# Patient Record
Sex: MALE | Race: WHITE | Employment: FULL TIME | URBAN - NONMETROPOLITAN AREA
[De-identification: names, ages, dates, MRNs, and addresses within clinical notes are randomized per-mention and may not be internally consistent; named-entity substitution may affect disease eponyms.]

---
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
## 2021-08-09 ENCOUNTER — HOSPITAL ENCOUNTER (EMERGENCY)
Age: 21
Discharge: HOME | End: 2021-08-09
Payer: COMMERCIAL

## 2021-08-09 VITALS
RESPIRATION RATE: 15 BRPM | HEART RATE: 85 BPM | OXYGEN SATURATION: 99 % | TEMPERATURE: 98.3 F | SYSTOLIC BLOOD PRESSURE: 143 MMHG | DIASTOLIC BLOOD PRESSURE: 76 MMHG

## 2021-08-09 DIAGNOSIS — Z20.822 PERSON UNDER INVESTIGATION FOR COVID-19: Primary | ICD-10-CM

## 2021-08-09 LAB — SARS-COV-2, NAAT: NOT DETECTED

## 2021-08-09 PROCEDURE — 99282 EMERGENCY DEPT VISIT SF MDM: CPT

## 2021-08-09 PROCEDURE — 87635 SARS-COV-2 COVID-19 AMP PRB: CPT

## 2021-08-09 ASSESSMENT — ENCOUNTER SYMPTOMS
WHEEZING: 0
COUGH: 0
DIARRHEA: 0
CHEST TIGHTNESS: 0
RHINORRHEA: 0
NAUSEA: 0
BACK PAIN: 0
EYE DISCHARGE: 0
VOMITING: 0
EYE PAIN: 0

## 2021-08-09 NOTE — ED NOTES
Presents to ER with complaints of needing a COVID test. Pt states his boyfriend tested positive and he has been with him the last few days. Pt denies any symptoms.      Lorin Beth Person) ISMA Duncan RN  08/09/21 3936

## 2021-08-09 NOTE — ED PROVIDER NOTES
[facility] 65 22 COMPLAINT       Chief Complaint   Patient presents with    Covid Testing       Nurses Notes reviewed and I agree except as notedin the HPI. HISTORY OF PRESENT ILLNESS    Calvin Su is a 24 y.o. male who presents requesting Covid testing. The patient is currently not having symptoms. His boyfriend tested positive for COVID-19 and he is unvaccinated. He had a high risk exposure. Akhil Collins REVIEW OF SYSTEMS     Review of Systems   Constitutional: Negative for chills, fatigue and fever. HENT: Negative for congestion, ear pain and rhinorrhea. Eyes: Negative for pain and discharge. Respiratory: Negative for cough, chest tightness and wheezing. Cardiovascular: Negative for chest pain, palpitations and leg swelling. Gastrointestinal: Negative for diarrhea, nausea and vomiting. Genitourinary: Negative for dysuria and frequency. Musculoskeletal: Negative for arthralgias, back pain, myalgias and neck pain. Skin: Negative for rash. Neurological: Negative for dizziness, weakness and headaches. All other systems reviewed and are negative. PAST MEDICAL HISTORY    has a past medical history of Eczema. SURGICAL HISTORY      has no past surgical history on file.     CURRENT MEDICATIONS       Discharge Medication List as of 8/9/2021  2:09 PM      CONTINUE these medications which have NOT CHANGED    Details   ondansetron (ZOFRAN ODT) 4 MG disintegrating tablet Take 1 tablet by mouth every 8 hours as needed for Nausea or Vomiting, Disp-12 tablet, R-0Print      naproxen (NAPROSYN) 500 MG tablet Take 1 tablet by mouth 2 times daily (with meals) for 20 doses, Disp-20 tablet, R-0Print      tiZANidine (ZANAFLEX) 4 MG tablet Take 1 tablet by mouth every 6 hours as needed (Pain and spasm) Caution not at work will cause drowsiness, Disp-15 tablet, R-0Print      triamcinolone (KENALOG) 0.1 % cream Apply topically 2 times daily Apply topically 2 times daily. , Topical, 2 TIMES DAILY, Until Discontinued, Historical Med             ALLERGIES     has No Known Allergies. HISTORY     He indicated that his mother is alive. He indicated that his father is alive. He indicated that the status of his maternal grandmother is unknown. He indicated that the status of his paternal grandmother is unknown. He indicated that the status of his neg hx is unknown.   family history includes Cancer in his maternal grandmother and paternal grandmother; Diabetes in his maternal grandmother. SOCIALHISTORY      reports that he has been smoking. He has a 0.50 pack-year smoking history. He has never used smokeless tobacco. He reports current drug use. Drug: Marijuana. He reports that he does not drink alcohol. PHYSICAL EXAM     INITIAL VITALS:  oral temperature is 98.3 °F (36.8 °C). His blood pressure is 143/76 (abnormal) and his pulse is 85. His respiration is 15 and oxygen saturation is 99%. Physical Exam  Vitals and nursing note reviewed. Constitutional:       Comments: Well Developed Well Nourished Appearing     HENT:      Head: Normocephalic and atraumatic. Right Ear: Tympanic membrane normal.      Left Ear: Tympanic membrane normal.   Eyes:      Pupils: Pupils are equal, round, and reactive to light. Cardiovascular:      Rate and Rhythm: Normal rate and regular rhythm. Heart sounds: Normal heart sounds. Pulmonary:      Effort: Pulmonary effort is normal. No respiratory distress. Breath sounds: Normal breath sounds. No wheezing. Abdominal:      General: Bowel sounds are normal. There is no distension. Palpations: Abdomen is soft. Musculoskeletal:      Cervical back: Normal range of motion and neck supple.          DIFFERENTIAL DIAGNOSIS:   Covid exposure    DIAGNOSTIC RESULTS     EKG: All EKG's are interpreted by the Emergency Department Physician who either signs or Co-signs this chart in the absence of a cardiologist.      RADIOLOGY: non-plain film images(s) such as CT, Ultrasound and MRI are read by the radiologist.  None      LABS:   Labs Reviewed   COVID-19, RAPID       EMERGENCY DEPARTMENT COURSE:   :    Vitals:    08/09/21 1318   BP: (!) 143/76   Pulse: 85   Resp: 15   Temp: 98.3 °F (36.8 °C)   TempSrc: Oral   SpO2: 99%     Patient was seen history physical exam was performed. See disposition below    CRITICAL CARE:  None    CONSULTS:  None    PROCEDURES:  None    FINAL IMPRESSION      1. Person under investigation for COVID-19          DISPOSITION/PLAN   Discharge    PATIENT REFERRED TO:  Evi Montoya, APRN - CNP  [address] Jessica Horowitz  [phone]    In 1 week  You had a first degree exposure (high risk) exposure to COVID 19. You need to self isolate for 10 days. Call [phone] for vaccine information.       DISCHARGE MEDICATIONS:  Discharge Medication List as of 8/9/2021  2:09 PM          (Please note that portions of this note were completed with a voice recognitionprogram.  Efforts were made to edit the dictations but occasionally words are mis-transcribed.)    VIRGIL Genao PA  08/09/21 2300 [facility], [address] Kate Bautista  08/09/21 9700

## 2022-02-18 ENCOUNTER — OFFICE VISIT (OUTPATIENT)
Dept: FAMILY MEDICINE CLINIC | Age: 22
End: 2022-02-18
Payer: COMMERCIAL

## 2022-02-18 VITALS
HEART RATE: 83 BPM | RESPIRATION RATE: 12 BRPM | SYSTOLIC BLOOD PRESSURE: 126 MMHG | BODY MASS INDEX: 19.67 KG/M2 | HEIGHT: 73 IN | TEMPERATURE: 98.2 F | WEIGHT: 148.4 LBS | OXYGEN SATURATION: 98 % | DIASTOLIC BLOOD PRESSURE: 78 MMHG

## 2022-02-18 DIAGNOSIS — H61.23 BILATERAL IMPACTED CERUMEN: ICD-10-CM

## 2022-02-18 DIAGNOSIS — Z76.89 ENCOUNTER TO ESTABLISH CARE: Primary | ICD-10-CM

## 2022-02-18 PROCEDURE — 99203 OFFICE O/P NEW LOW 30 MIN: CPT | Performed by: NURSE PRACTITIONER

## 2022-02-18 ASSESSMENT — PATIENT HEALTH QUESTIONNAIRE - PHQ9
2. FEELING DOWN, DEPRESSED OR HOPELESS: 0
SUM OF ALL RESPONSES TO PHQ QUESTIONS 1-9: 0
SUM OF ALL RESPONSES TO PHQ9 QUESTIONS 1 & 2: 0
1. LITTLE INTEREST OR PLEASURE IN DOING THINGS: 0
SUM OF ALL RESPONSES TO PHQ QUESTIONS 1-9: 0

## 2022-02-18 NOTE — PATIENT INSTRUCTIONS
Patient Education        Earwax Blockage: Care Instructions  Your Care Instructions     Earwax is a natural substance that protects the ear canal. Normally, earwax drains from the ears and does not cause problems. Sometimes earwax builds up and hardens. Earwax blockage (also called cerumen impaction) can cause some loss of hearing and pain. When wax is tightly packed, you will need to have your doctor remove it. Follow-up care is a key part of your treatment and safety. Be sure to make and go to all appointments, and call your doctor if you are having problems. It's also a good idea to know your test results and keep a list of the medicines you take. How can you care for yourself at home? · Do not try to remove earwax with cotton swabs, fingers, or other objects. This can make the blockage worse and damage the eardrum. · If your doctor recommends that you try to remove earwax at home:  ? Soften and loosen the earwax with warm mineral oil. You also can try hydrogen peroxide mixed with an equal amount of room temperature water. Place 2 drops of the fluid, warmed to body temperature, in the ear two times a day for up to 5 days. ? Once the wax is loose and soft, all that is usually needed to remove it from the ear canal is a gentle, warm shower. Direct the water into the ear, then tip your head to let the earwax drain out. Dry your ear thoroughly with a hair dryer set on low. Hold the dryer several inches from your ear. ? If the warm mineral oil and shower do not work, use an over-the-counter wax softener. Read and follow all instructions on the label. After using the wax softener, use an ear syringe to gently flush the ear. Make sure the flushing solution is body temperature. Cool or hot fluids in the ear can cause dizziness. When should you call for help?    Call your doctor now or seek immediate medical care if:    · Pus or blood drains from your ear.     · Your ears are ringing or feel full.     · You have a loss of hearing. Watch closely for changes in your health, and be sure to contact your doctor if:    · You have pain or reduced hearing after 1 week of home treatment.     · You have any new symptoms, such as nausea or balance problems. Where can you learn more? Go to https://[email or web address].[email or web address] org and sign in to your [employer] account. Enter H510 in the [employer] Love box to learn more about \"Earwax Blockage: Care Instructions. \"     If you do not have an account, please click on the \"Sign Up Now\" link. Current as of: July 1, 2021               Content Version: 13.1  © 7139-0215 [employer]wise, [employer]. Care instructions adapted under license by [facility] ([facility]). If you have questions about a medical condition or this instruction, always ask your healthcare professional. [address] any warranty or liability for your use of this information.

## 2022-02-18 NOTE — PROGRESS NOTES
[facility] Medicine at / [address] [facility]  [address] [facility]  Chief Complaint   Patient presents with    Ear Fullness     Left ear started hurting yesterday        History obtained from chart review and the patient. SUBJECTIVE:  Stevenson Reed is a 25 y.o. male that presents today for establishing care with new physician, etc. New patient, [facility]. Ear Complaint    HPI:  Symptoms have been present for 1-2 day(s). Can't hear out of left ear, feels full and plugged. Inciting incident or history of trauma? no  Decreased hearing? Yes  Ear tenderness? No  Ear drainage? No  Feeling of fullness? Yes  Radiation of the pain? No  Dizziness or pre-syncope? No  Affected by position or head movment? No  Sore throat, rhinorrhea or sinus congestion? No  Hx of Bruxism? No  Treatment tried and response - nothing      Age/Gender Health Maintenance    Lipid - 40  DM Screen - 40  Colon Cancer Screening - 39  Lung Cancer Screening (Age 54 to [de-identified] with 30 pack year hx, current smoker or quit within past 15 years) - 48    Tetanus - needs  Influenza Vaccine - yearly  Pneumonia Vaccine - 65  Zostavax - 50   HPV Vaccine - needs    PSA testing discussion - 55  AAA Screening - n/a    Falls screening - n/a    Current Outpatient Medications   Medication Sig Dispense Refill    carbamide peroxide (DEBROX) 6.5 % otic solution Place 5 drops into both ears 2 times daily 15 mL 2     No current facility-administered medications for this visit. Orders Placed This Encounter   Medications    carbamide peroxide (DEBROX) 6.5 % otic solution     Sig: Place 5 drops into both ears 2 times daily     Dispense:  15 mL     Refill:  2         All medications reviewed and reconciled, including OTC and herbal medications. Updated list given to patient. There is no problem list on file for this patient. Past Medical History:   Diagnosis Date    Eczema        History reviewed.  No pertinent surgical  Unstable Housing in the Last Year: Not on file       Family History   Problem Relation Age of Onset    Cancer Maternal Grandmother         colon    Diabetes Maternal Grandmother     Cancer Paternal Grandmother         colon    Asthma Neg Hx     Stroke Neg Hx          I have reviewed the patient's past medical history, past surgical history, allergies, medications, social and family history and I have made updates where appropriate. Review of Systems  Positive responses are highlighted in bold    Constitutional:  Fever, Chills, Night Sweats, Fatigue, Unexpected changes in weight  Eyes:  Eye discharge, Eye pain, Eye redness, Visual disturbances   HENT:  Ear pain, Tinnitus, Nosebleeds, Trouble swallowing, Hearing loss, Sore throat  Cardiovascular:  Chest Pain, Palpitations, Orthopnea, Paroxysmal Nocturnal Dyspnea  Respiratory:  Cough, Wheezing, Shortness of breath, Chest tightness, Apnea  Gastrointestinal:  Nausea, Vomiting, Diarrhea, Constipation, Heartburn, Blood in stool  Genitourinary:  Difficulty or painful urination, Flank pain, Change in frequency, Urgency  Skin:  Color change, Rash, Itching, Wound  Psychiatric:  Hallucinations, Anxiety, Depression, Suicidal ideation  Hematological:  Enlarged glands, Easy bleeding, Easily bruising  Musculoskeletal:  Joint pain, Back pain, Gait problems, Joint swelling, Myalgias  Neurological:  Dizziness, Headaches, Presyncope, Numbness, Seizures, Tremors  Allergy:  Environmental allergies, Food allergies  Endocrine:  Heat Intolerance, Cold Intolerance, Polydipsia, Polyphagia, Polyuria      PHYSICAL EXAM:  Vitals:    02/18/22 1000   BP: 126/78   Pulse: 83   Resp: 12   Temp: 98.2 °F (36.8 °C)   TempSrc: Oral   SpO2: 98%   Weight: 148 lb 6.4 oz (67.3 kg)   Height: 6' 1\" (1.854 m)     Body mass index is 19.58 kg/m².          VS Reviewed  General Appearance: A&O x 3, No acute distress,well developed and well- nourished  Head: normocephalic and atraumatic  Eyes: pupils equal, round, and reactive to light, extraocular eye movements intact, conjunctivae and eye lids without erythema  ENT: external ear and ear canals completely impacted with cerumen plug  Neck: supple and non-tender without mass, no thyromegaly or thyroid nodules, no cervical lymphadenopathy  Pulmonary/Chest: clear to auscultation bilaterally- no wheezes, rales or rhonchi, normal air movement, no respiratory distress or retractions  Cardiovascular: S1 and S2 auscultated w/ RRR. No murmurs, rubs, clicks, or gallops, distal pulses intact. Abdomen: soft, non-tender, non-distended, bowl sounds physiologic,  no rebound or guarding, no masses or hernias noted. Liver and spleen without enlargement. Extremities: no cyanosis, clubbing or edema of the lower extremities  Musculoskeletal: No joint swelling or gross deformity   Neuro:  Alert, 5/5 strength globally and symmetrically  Psych: Affect appropriate. Mood normal. Thought process is normal without evidence of depression or psychosis. Good insight and appropriate interaction. Cognition and memory appear to be intact. Skin: warm and dry, no rash or erythema  Lymph:  No cervical, auricular or supraclavicular lymph nodes palpated    ASSESSMENT & PLAN  Jamia Pham was seen today for ear fullness. Diagnoses and all orders for this visit:    Encounter to establish care    Bilateral impacted cerumen  -     carbamide peroxide (DEBROX) 6.5 % otic solution; Place 5 drops into both ears 2 times daily      - ear lavage completed with good results  - start Debrox    DISPOSITION    Return if symptoms worsen or fail to improve. Jamia Pham released without restrictions. PATIENT COUNSELING    Counseling was provided today regarding the following topics: Healthy eating habits, Regular exercise, substance abuse and healthy sleep habits.     Jamia Pham received counseling on the following healthy behaviors: none    Patient given educational materials on: See Attached    I have instructed Jamia Pham to complete a self tracking handout on none and instructed them to bring it with them to his next appointment. Barriers to learning and self management: none    Discussed use, benefit, and side effects of prescribed medications. Barriers to medication compliance addressed. All patient questions answered. Pt voiced understanding.        Electronically signed by CARLOS ALBERTO Curran CNP on 2/18/2022 at 10:21 AM

## 2024-11-06 ENCOUNTER — HOSPITAL ENCOUNTER (EMERGENCY)
Age: 24
Discharge: HOME | End: 2024-11-06
Payer: COMMERCIAL

## 2024-11-06 VITALS
OXYGEN SATURATION: 100 % | RESPIRATION RATE: 20 BRPM | SYSTOLIC BLOOD PRESSURE: 120 MMHG | DIASTOLIC BLOOD PRESSURE: 90 MMHG | TEMPERATURE: 98.1 F | HEART RATE: 117 BPM
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
## 2024-11-06 DIAGNOSIS — I95.9 HYPOTENSION, UNSPECIFIED HYPOTENSION TYPE: ICD-10-CM

## 2024-11-06 DIAGNOSIS — Z87.898 HISTORY OF ACUTE ALCOHOL INTOXICATION: ICD-10-CM

## 2024-11-06 PROCEDURE — 99203 OFFICE O/P NEW LOW 30 MIN: CPT | Performed by: NURSE PRACTITIONER

## 2024-11-06 PROCEDURE — 90715 TDAP VACCINE 7 YRS/> IM: CPT | Performed by: NURSE PRACTITIONER

## 2024-11-06 PROCEDURE — 6360000002 HC RX W HCPCS: Performed by: NURSE PRACTITIONER

## 2024-11-06 PROCEDURE — 90471 IMMUNIZATION ADMIN: CPT | Performed by: NURSE PRACTITIONER

## 2024-11-06 PROCEDURE — 99203 OFFICE O/P NEW LOW 30 MIN: CPT

## 2024-11-06 RX ORDER — MUPIROCIN 20 MG/G
OINTMENT TOPICAL
Qty: 15 G | Refills: 0 | Status: SHIPPED | OUTPATIENT
Start: 2024-11-06 | End: 2024-11-06

## 2024-11-06 RX ORDER — DOXYCYCLINE HYCLATE 100 MG
100 TABLET ORAL 2 TIMES DAILY
Qty: 14 TABLET | Refills: 0 | Status: SHIPPED | OUTPATIENT
Start: 2024-11-06 | End: 2024-11-06

## 2024-11-06 RX ORDER — DOXYCYCLINE HYCLATE 100 MG
100 TABLET ORAL 2 TIMES DAILY
Qty: 14 TABLET | Refills: 0 | Status: SHIPPED | OUTPATIENT
Start: 2024-11-06 | End: 2024-11-13

## 2024-11-06 RX ORDER — MUPIROCIN 20 MG/G
OINTMENT TOPICAL
Qty: 15 G | Refills: 0 | Status: SHIPPED | OUTPATIENT
Start: 2024-11-06 | End: 2024-11-13

## 2024-11-06 RX ADMIN — TETANUS TOXOID, REDUCED DIPHTHERIA TOXOID AND ACELLULAR PERTUSSIS VACCINE, ADSORBED 0.5 ML: 5; 2.5; 8; 8; 2.5 SUSPENSION INTRAMUSCULAR at 12:21

## 2024-11-06 ASSESSMENT — PAIN - FUNCTIONAL ASSESSMENT: PAIN_FUNCTIONAL_ASSESSMENT: NONE - DENIES PAIN

## 2024-11-06 NOTE — ED NOTES
Patient presents to [facility] with significant other with complaints of cat scratches from a stray last evening. Patient has scratches in bilaterally hands that are painful and swollen      Afia Salmeron RN  11/06/24 2017

## 2024-11-06 NOTE — ED PROVIDER NOTES
[facility] URGENT CARE  UrgentCare Encounter      CHIEFCOMPLAINT       Chief Complaint   Patient presents with    Animal Bite     cat       Nurses Notes reviewed and I agree except as noted in the HPI.  HISTORY OF PRESENT ILLNESS     Hu Ch is a 24 y.o. male who presents to the urgent care for evaluation of infected cat scratches on the his hands. It was a stray cat, happened yesterday.  Left hand middle finger and right hand thumb. He cleaned the scratches and applied OTC topical antibiotic. No lymph node swelling. No fevers.     The patient/patient representative has no other acute complaints at this time.    REVIEW OF SYSTEMS     Review of Systems   Constitutional:  Negative for chills and fever.   Cardiovascular:  Negative for chest pain.   Skin:  Positive for wound (cat scratches).   Allergic/Immunologic: Negative for environmental allergies and food allergies.       PAST MEDICAL HISTORY         Diagnosis Date    Eczema        SURGICAL HISTORY     Patient  has no past surgical history on file.    CURRENT MEDICATIONS       Discharge Medication List as of 11/6/2024 12:34 PM        CONTINUE these medications which have NOT CHANGED    Details   carbamide peroxide (DEBROX) 6.5 % otic solution Place 5 drops into both ears 2 times daily, Disp-15 mL, R-2Normal             ALLERGIES     Patient is has No Known Allergies.    FAMILY HISTORY     Patient'sfamily history includes Cancer in his maternal grandmother and paternal grandmother; Diabetes in his maternal grandmother.    SOCIAL HISTORY     Patient  reports that he has been smoking cigarettes. He has a 0.5 pack-year smoking history. He has never used smokeless tobacco. He reports current drug use. Drug: Marijuana (Weed). He reports that he does not drink alcohol.    PHYSICAL EXAM     ED TRIAGE VITALS  BP: (!) 120/90, Temp: 98.1 °F (36.7 °C), Pulse: (!) 117, Respirations: 20, SpO2: 100 %  Physical Exam  Vitals and nursing note reviewed.

## 2025-01-07 ENCOUNTER — HOSPITAL ENCOUNTER (EMERGENCY)
Age: 25
Discharge: HOME | End: 2025-01-07
Payer: COMMERCIAL

## 2025-01-07 VITALS
DIASTOLIC BLOOD PRESSURE: 76 MMHG | TEMPERATURE: 97.8 F | SYSTOLIC BLOOD PRESSURE: 131 MMHG | BODY MASS INDEX: 19.22 KG/M2 | RESPIRATION RATE: 16 BRPM | HEART RATE: 96 BPM | OXYGEN SATURATION: 96 % | WEIGHT: 145 LBS | HEIGHT: 73 IN

## 2025-01-07 DIAGNOSIS — J00 ACUTE NASOPHARYNGITIS: Primary | ICD-10-CM

## 2025-01-07 PROCEDURE — 99213 OFFICE O/P EST LOW 20 MIN: CPT

## 2025-01-07 PROCEDURE — 99213 OFFICE O/P EST LOW 20 MIN: CPT | Performed by: NURSE PRACTITIONER

## 2025-01-07 RX ORDER — FLUTICASONE PROPIONATE 50 MCG
2 SPRAY, SUSPENSION (ML) NASAL DAILY
Qty: 16 G | Refills: 0 | Status: SHIPPED | OUTPATIENT
Start: 2025-01-07

## 2025-01-07 RX ORDER — CETIRIZINE HYDROCHLORIDE, PSEUDOEPHEDRINE HYDROCHLORIDE 5; 120 MG/1; MG/1
1 TABLET, FILM COATED, EXTENDED RELEASE ORAL 2 TIMES DAILY
Qty: 60 TABLET | Refills: 0 | Status: SHIPPED | OUTPATIENT
Start: 2025-01-07 | End: 2025-02-06

## 2025-01-07 ASSESSMENT — ENCOUNTER SYMPTOMS
NAUSEA: 0
COUGH: 0
SORE THROAT: 1

## 2025-01-07 ASSESSMENT — PAIN DESCRIPTION - LOCATION: LOCATION: EAR

## 2025-01-07 ASSESSMENT — PAIN SCALES - GENERAL: PAINLEVEL_OUTOF10: 6

## 2025-01-07 ASSESSMENT — PAIN DESCRIPTION - ORIENTATION: ORIENTATION: LEFT

## 2025-01-07 ASSESSMENT — PAIN - FUNCTIONAL ASSESSMENT: PAIN_FUNCTIONAL_ASSESSMENT: 0-10

## 2025-01-07 NOTE — ED PROVIDER NOTES
[facility] URGENT CARE  Urgent Care Encounter       CHIEF COMPLAINT       Chief Complaint   Patient presents with    Ear Pain    Pharyngitis       Nurses Notes reviewed and I agree except as noted in the HPI.  HISTORY OF PRESENT ILLNESS   Hu Ch is a 25 y.o. male who presents with new onset sore throat, congestion, and left ear pain.  This is a new problem that started in the past 3 days.  He reports worsening of the left ear pain.  No reported fever.  No cough or headache.  Did try Tylenol which was ineffective.    The history is provided by the patient.       REVIEW OF SYSTEMS     Review of Systems   Constitutional:  Negative for fever.   HENT:  Positive for congestion, ear pain (left) and sore throat.    Respiratory:  Negative for cough.    Cardiovascular:  Negative for chest pain.   Gastrointestinal:  Negative for nausea.   Musculoskeletal:  Negative for myalgias.   Neurological:  Negative for headaches.       PAST MEDICAL HISTORY         Diagnosis Date    Eczema        SURGICALHISTORY     Patient  has no past surgical history on file.    CURRENT MEDICATIONS       Discharge Medication List as of 1/7/2025  1:24 PM        CONTINUE these medications which have NOT CHANGED    Details   carbamide peroxide (DEBROX) 6.5 % otic solution Place 5 drops into both ears 2 times daily, Disp-15 mL, R-2Normal             ALLERGIES     Patient is has No Known Allergies.    Patients   Immunization History   Administered Date(s) Administered    DTaP 2000, 2000, 2000, 02/12/2003, 08/10/2005    DTaP, INFANRIX, (age 6w-6y), IM, 0.5mL 2000, 2000, 02/12/2003, 08/10/2005    HPV, GARDASIL 9, (age 9y-45y), IM, 0.5mL 09/21/2017    Hep B, ENGERIX-B, (age 20y+), IM, 1mL 2000, 2000, 01/22/2001    Hepatitis A Ped/Adol (Vaqta) 09/21/2017    Hepatitis B (Engerix-B) 2000, 2000, 01/22/2001    Hib PRP-OMP, PEDVAXHIB, (age 2m-6y, Adlt Risk), IM, 0.5mL 2000, 2000,

## 2025-01-07 NOTE — ED NOTES
Pt complains of left earache and sore throat since 1/4/2025 not  getting better.     Dilia Carroll, RN  01/07/25 2960

## 2025-01-07 NOTE — DISCHARGE INSTR - COC
Continuity of Care Form    Patient Name: Hu Ch   :  2000  MRN:  [id number]    Admit date:  2025  Discharge date:  ***    Code Status Order: No Order   Advance Directives:   Advance Care Flowsheet Documentation             Admitting Physician:  No admitting provider for patient encounter.  PCP: Luís Olsen, CARLOS ALBERTO Sanchez CNP    Discharging Nurse: ***  Discharging Hospital Unit/Room#:   Discharging Unit Phone Number: ***    Emergency Contact:   Extended Emergency Contact Information  Primary Emergency Contact: Antonia Ch  Address: [address] [place] [address] [place]  Home Phone: [phone]  Mobile Phone: [phone]  Relation: Parent    Past Surgical History:  History reviewed. No pertinent surgical history.    Immunization History:   Immunization History   Administered Date(s) Administered    DTaP 2000, 2000, 2000, 2003, 08/10/2005    DTaP, INFANRIX, (age 6w-6y), IM, 0.5mL 2000, 2000, 2003, 08/10/2005    HPV, GARDASIL 9, (age 9y-45y), IM, 0.5mL 2017    Hep B, ENGERIX-B, (age 20y+), IM, 1mL 2000, 2000, 2001    Hepatitis A Ped/Adol (Vaqta) 2017    Hepatitis B (Engerix-B) 2000, 2000, 2001    Hib PRP-OMP, PEDVAXHIB, (age 2m-6y, Adlt Risk), IM, 0.5mL 2000, 2000, 2000, 2003    MMR, PRIORIX, M-M-R II, (age 12m+), SC, 0.5mL 2001, 08/10/2005    Meningococcal ACWY, MENACTRA (MenACWY-D), (age 9m-55y), IM, 0.5mL 2017    Poliovirus, IPOL, (age 6w+), SC/IM, 0.5mL 2000, 2000, 2000, 08/10/2005    TDaP, ADACEL (age 10y-64y), BOOSTRIX (age 10y+), IM, 0.5mL 2011, 2024    Varicella, VARIVAX, (age 12m+), SC, 0.5mL 2001, 2011       Active Problems:  There is no problem list on file for this patient.      Isolation/Infection:   Isolation            No Isolation          Patient Infection Status       None to  {EQUIPMENT:583134330}  Other Treatments: ***    Patient's personal belongings (please select all that are sent with patient):  {CHP DME Belongings:208804083}    RN SIGNATURE:  {Esignature:636393849}    CASE MANAGEMENT/SOCIAL WORK SECTION    Inpatient Status Date: ***    Readmission Risk Assessment Score:  [facility] RISK OF UNPLANNED READMISSION 2.0             0 Total Score        Discharging to Facility/ Agency   Name:   Address:  Phone:  Fax:    Dialysis Facility (if applicable)   Name:  Address:  Dialysis Schedule:  Phone:  Fax:    / signature: {Esignature:005177423}    PHYSICIAN SECTION    Prognosis: {Prognosis:8640401590}    Condition at Discharge: { Patient Condition:596627209}    Rehab Potential (if transferring to Rehab): {Prognosis:6295598766}    Recommended Labs or Other Treatments After Discharge: ***    Physician Certification: I certify the above information and transfer of Hu Ch  is necessary for the continuing treatment of the diagnosis listed and that he requires {Admit to Appropriate Level of Care:94972} for {GREATER/LESS:162408952} 30 days.     Update Admission H&P: {CHP DME Changes in HandP:608452039}    PHYSICIAN SIGNATURE:  {Esignature:107056078}

## 2025-01-08 ENCOUNTER — TELEPHONE (OUTPATIENT)
Dept: FAMILY MEDICINE CLINIC | Age: 25
End: 2025-01-08

## 2025-01-08 NOTE — TELEPHONE ENCOUNTER
[facility] Transitions Initial Follow Up Call    Outreach made within 2 business days of discharge: Yes    Patient: Hu Ch Patient : 2000   MRN: [id number]  Reason for Admission: There are no discharge diagnoses documented for the most recent discharge.  Discharge Date: 25       Spoke with: MI with TJ    Discharge department/facility: UC visit    TCM Interactive Patient Contact:  Was patient able to fill all prescriptions:   Was patient instructed to bring all medications to the follow-up visit:   Is patient taking all medications as directed in the discharge summary?   Does patient understand their discharge instructions:   Does patient have questions or concerns that need addressed prior to 7 day follow up office visit:     Scheduled appointment with PCP within 7-14 days    Follow Up  No future appointments.    Afia Abad MA

## 2025-01-09 NOTE — TELEPHONE ENCOUNTER
Care Transitions Initial Follow Up Call    Outreach made within 2 business days of discharge: Yes    Patient: Hu Ch Patient : 2000   MRN: [id number]  Reason for Admission: SR  Discharge Date: 25       Spoke with: left message     Discharge department/facility:     [facility] Interactive Patient Contact:      Additional needs identified to be addressed with provider               Scheduled appointment with PCP within 7-14 days    Follow Up  No future appointments.    Cherrie Kern MA